# Patient Record
Sex: MALE | Race: WHITE
[De-identification: names, ages, dates, MRNs, and addresses within clinical notes are randomized per-mention and may not be internally consistent; named-entity substitution may affect disease eponyms.]

---

## 2023-08-17 ENCOUNTER — HOSPITAL ENCOUNTER (OUTPATIENT)
Dept: HOSPITAL 47 - RADXRMAIN | Age: 23
Discharge: HOME | End: 2023-08-17
Attending: FAMILY MEDICINE
Payer: COMMERCIAL
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
## 2023-08-17 DIAGNOSIS — S62.616A: Primary | ICD-10-CM

## 2023-08-17 DIAGNOSIS — M12.571: ICD-10-CM

## 2023-08-17 NOTE — XR
EXAMINATION TYPE: XR foot complete RT

 

DATE OF EXAM: 8/17/2023 12:22 PM

 

INDICATION: 

Patient age:Male;  23 years old; 

Reason for study: M12.571 trauma; H. 

 

COMPARISON: None

 

TECHNIQUE: The right foot was examined in the AP, oblique, and lateral projections.  

 

FINDINGS/IMPRESSION: 

Acute fracture through the fifth digit proximal phalanx base without significant effacement. There is
 soft tissue scarring. No additional fractures.